# Patient Record
Sex: FEMALE | Race: WHITE | NOT HISPANIC OR LATINO | URBAN - METROPOLITAN AREA
[De-identification: names, ages, dates, MRNs, and addresses within clinical notes are randomized per-mention and may not be internally consistent; named-entity substitution may affect disease eponyms.]

---

## 2021-11-09 ENCOUNTER — OFFICE VISIT (OUTPATIENT)
Dept: URBAN - METROPOLITAN AREA CLINIC 118 | Facility: CLINIC | Age: 66
End: 2021-11-09
Payer: MEDICARE

## 2021-11-09 ENCOUNTER — DASHBOARD ENCOUNTERS (OUTPATIENT)
Age: 66
End: 2021-11-09

## 2021-11-09 ENCOUNTER — LAB OUTSIDE AN ENCOUNTER (OUTPATIENT)
Dept: URBAN - METROPOLITAN AREA CLINIC 118 | Facility: CLINIC | Age: 66
End: 2021-11-09

## 2021-11-09 VITALS
DIASTOLIC BLOOD PRESSURE: 87 MMHG | HEIGHT: 66 IN | BODY MASS INDEX: 27.8 KG/M2 | HEART RATE: 86 BPM | TEMPERATURE: 97.9 F | WEIGHT: 173 LBS | SYSTOLIC BLOOD PRESSURE: 139 MMHG

## 2021-11-09 DIAGNOSIS — K21.9 GASTROESOPHAGEAL REFLUX DISEASE WITHOUT ESOPHAGITIS: ICD-10-CM

## 2021-11-09 DIAGNOSIS — R13.10 DYSPHAGIA, UNSPECIFIED TYPE: ICD-10-CM

## 2021-11-09 PROCEDURE — 99204 OFFICE O/P NEW MOD 45 MIN: CPT | Performed by: INTERNAL MEDICINE

## 2021-11-09 NOTE — HPI-TODAY'S VISIT:
pt presents for UGI symptoms. Pt reports h/o gerd and takes otc antacids prn. Reports feeling of food stuck upper esophageal area as well as when food is going down esophagus. Reports nfeeling of 'burning' along esophageal area. Mild nausea. Denies weight loss or anemia. Pt has not tried any meds for relieve. Pt reports feeling recently of cannot breath due to similar issues, went to ED, evaluation neative, cardiac eval negative, referred to GI. No LGI symptoms. Last colonoscopy >10 years.

## 2021-11-10 PROBLEM — 40739000: Status: ACTIVE | Noted: 2021-11-09

## 2021-11-10 PROBLEM — 266435005: Status: ACTIVE | Noted: 2021-11-09

## 2021-11-19 ENCOUNTER — CLAIMS CREATED FROM THE CLAIM WINDOW (OUTPATIENT)
Dept: URBAN - METROPOLITAN AREA CLINIC 4 | Facility: CLINIC | Age: 66
End: 2021-11-19
Payer: MEDICARE

## 2021-11-19 ENCOUNTER — OFFICE VISIT (OUTPATIENT)
Dept: URBAN - METROPOLITAN AREA SURGERY CENTER 23 | Facility: SURGERY CENTER | Age: 66
End: 2021-11-19
Payer: MEDICARE

## 2021-11-19 DIAGNOSIS — K21.9 GASTRO-ESOPHAGEAL REFLUX DISEASE WITHOUT ESOPHAGITIS: ICD-10-CM

## 2021-11-19 DIAGNOSIS — K31.89 DEFORMED PYLORUS, ACQUIRED: ICD-10-CM

## 2021-11-19 DIAGNOSIS — K22.2 ACQUIRED ESOPHAGEAL RING: ICD-10-CM

## 2021-11-19 DIAGNOSIS — K21.9 ACID REFLUX: ICD-10-CM

## 2021-11-19 DIAGNOSIS — K31.89 ACQUIRED DEFORMITY OF DUODENUM: ICD-10-CM

## 2021-11-19 PROCEDURE — 88312 SPECIAL STAINS GROUP 1: CPT | Performed by: PATHOLOGY

## 2021-11-19 PROCEDURE — 88305 TISSUE EXAM BY PATHOLOGIST: CPT | Performed by: PATHOLOGY

## 2021-11-19 PROCEDURE — G8907 PT DOC NO EVENTS ON DISCHARG: HCPCS | Performed by: INTERNAL MEDICINE

## 2021-11-19 PROCEDURE — 43249 ESOPH EGD DILATION <30 MM: CPT | Performed by: INTERNAL MEDICINE

## 2021-11-19 PROCEDURE — 43239 EGD BIOPSY SINGLE/MULTIPLE: CPT | Performed by: INTERNAL MEDICINE

## 2024-01-29 ENCOUNTER — NEW PATIENT (OUTPATIENT)
Dept: URBAN - NONMETROPOLITAN AREA CLINIC 6 | Facility: CLINIC | Age: 69
End: 2024-01-29

## 2024-01-29 DIAGNOSIS — H25.13: ICD-10-CM

## 2024-01-29 PROCEDURE — 92134 CPTRZ OPH DX IMG PST SGM RTA: CPT

## 2024-01-29 PROCEDURE — 92015 DETERMINE REFRACTIVE STATE: CPT

## 2024-01-29 PROCEDURE — 92133 CPTRZD OPH DX IMG PST SGM ON: CPT

## 2024-01-29 PROCEDURE — 92004 COMPRE OPH EXAM NEW PT 1/>: CPT

## 2024-01-29 ASSESSMENT — KERATOMETRY
OD_K1POWER_DIOPTERS: 44.75
OS_K2POWER_DIOPTERS: 46.50
OS_K1POWER_DIOPTERS: 44.50
OS_AXISANGLE2_DEGREES: 99
OD_AXISANGLE_DEGREES: 163
OD_AXISANGLE2_DEGREES: 73
OD_K2POWER_DIOPTERS: 46.50
OS_AXISANGLE_DEGREES: 9

## 2024-01-29 ASSESSMENT — VISUAL ACUITY
OD_CC: 20/70
OD_SC: 20/400
OS_CC: 20/400
OD_PH: 20/40
OS_PH: 20/400
OS_SC: CF 3FT

## 2024-01-29 ASSESSMENT — TONOMETRY
OD_IOP_MMHG: 13
OS_IOP_MMHG: 13

## 2024-02-28 ENCOUNTER — PRE-OP/H&P (OUTPATIENT)
Dept: URBAN - NONMETROPOLITAN AREA CLINIC 6 | Facility: CLINIC | Age: 69
End: 2024-02-28

## 2024-02-28 DIAGNOSIS — H25.13: ICD-10-CM

## 2024-02-28 PROCEDURE — 92136 OPHTHALMIC BIOMETRY: CPT

## 2024-02-28 ASSESSMENT — KERATOMETRY
OS_AXISANGLE_DEGREES: 9
OD_K1POWER_DIOPTERS: 44.75
OD_K2POWER_DIOPTERS: 46.50
OS_AXISANGLE2_DEGREES: 99
OS_K1POWER_DIOPTERS: 44.50
OD_AXISANGLE_DEGREES: 163
OS_K2POWER_DIOPTERS: 46.50
OD_AXISANGLE2_DEGREES: 73

## 2024-04-18 ENCOUNTER — POST-OP (OUTPATIENT)
Dept: URBAN - NONMETROPOLITAN AREA CLINIC 6 | Facility: CLINIC | Age: 69
End: 2024-04-18

## 2024-04-18 DIAGNOSIS — Z96.1: ICD-10-CM

## 2024-04-18 PROCEDURE — 99024 POSTOP FOLLOW-UP VISIT: CPT

## 2024-04-18 ASSESSMENT — TONOMETRY: OS_IOP_MMHG: 19

## 2024-04-18 ASSESSMENT — VISUAL ACUITY: OS_SC: 20/60

## 2024-04-23 ENCOUNTER — POST OP/EVAL OF SECOND EYE (OUTPATIENT)
Dept: URBAN - NONMETROPOLITAN AREA CLINIC 6 | Facility: CLINIC | Age: 69
End: 2024-04-23

## 2024-04-23 DIAGNOSIS — H25.11: ICD-10-CM

## 2024-04-23 DIAGNOSIS — Z96.1: ICD-10-CM

## 2024-04-23 PROCEDURE — 99024 POSTOP FOLLOW-UP VISIT: CPT

## 2024-04-23 PROCEDURE — 92136 OPHTHALMIC BIOMETRY: CPT | Mod: 26

## 2024-04-23 ASSESSMENT — TONOMETRY
OD_IOP_MMHG: 16
OS_IOP_MMHG: 17

## 2024-04-23 ASSESSMENT — VISUAL ACUITY
OD_SC: 20/400
OS_SC: 20/40-1

## 2024-05-02 ENCOUNTER — SURGERY/PROCEDURE (OUTPATIENT)
Dept: URBAN - NONMETROPOLITAN AREA CLINIC 6 | Facility: CLINIC | Age: 69
End: 2024-05-02

## 2024-05-02 DIAGNOSIS — H25.13: ICD-10-CM

## 2024-05-02 PROBLEM — Z96.1: Noted: 2024-05-02

## 2024-05-02 PROCEDURE — 66984 XCAPSL CTRC RMVL W/O ECP: CPT | Mod: 79,RT

## 2024-05-03 ENCOUNTER — TECH ONLY (OUTPATIENT)
Dept: URBAN - NONMETROPOLITAN AREA CLINIC 6 | Facility: CLINIC | Age: 69
End: 2024-05-03

## 2024-05-03 DIAGNOSIS — Z96.1: ICD-10-CM

## 2024-05-03 PROCEDURE — 99024 POSTOP FOLLOW-UP VISIT: CPT

## 2024-05-03 ASSESSMENT — TONOMETRY: OD_IOP_MMHG: 17

## 2024-05-03 ASSESSMENT — VISUAL ACUITY: OD_SC: 20/30-2

## 2024-05-30 ENCOUNTER — POST-OP (OUTPATIENT)
Dept: URBAN - NONMETROPOLITAN AREA CLINIC 6 | Facility: CLINIC | Age: 69
End: 2024-05-30

## 2024-05-30 DIAGNOSIS — Z96.1: ICD-10-CM

## 2024-05-30 ASSESSMENT — KERATOMETRY
OS_K1POWER_DIOPTERS: 44.50
OD_K2POWER_DIOPTERS: 46.50
OS_AXISANGLE_DEGREES: 6
OD_K1POWER_DIOPTERS: 44.75
OD_AXISANGLE_DEGREES: 171
OS_AXISANGLE2_DEGREES: 96
OS_K2POWER_DIOPTERS: 46.50
OD_AXISANGLE2_DEGREES: 81

## 2024-05-30 ASSESSMENT — VISUAL ACUITY
OU_SC: 20/30
OS_SC: 20/40-2
OD_SC: 20/30-2

## 2024-05-30 ASSESSMENT — TONOMETRY
OS_IOP_MMHG: 14
OD_IOP_MMHG: 12

## 2024-06-05 ASSESSMENT — KERATOMETRY
OS_K2POWER_DIOPTERS: 46.50
OD_K2POWER_DIOPTERS: 46.50
OS_AXISANGLE2_DEGREES: 99
OD_AXISANGLE2_DEGREES: 73
OS_AXISANGLE_DEGREES: 9
OS_K1POWER_DIOPTERS: 44.50
OD_AXISANGLE_DEGREES: 163
OD_K1POWER_DIOPTERS: 44.75

## 2024-09-26 NOTE — PHYSICAL EXAM CONSTITUTIONAL:
well developed, well nourished , in no acute distress , ambulating without difficulty , normal communication ability
Resident/Fellow